# Patient Record
Sex: FEMALE | Race: WHITE | Employment: FULL TIME | URBAN - METROPOLITAN AREA
[De-identification: names, ages, dates, MRNs, and addresses within clinical notes are randomized per-mention and may not be internally consistent; named-entity substitution may affect disease eponyms.]

---

## 2017-05-10 ENCOUNTER — PATIENT MESSAGE (OUTPATIENT)
Dept: FAMILY MEDICINE CLINIC | Age: 42
End: 2017-05-10

## 2017-06-23 ENCOUNTER — PATIENT MESSAGE (OUTPATIENT)
Dept: FAMILY MEDICINE CLINIC | Age: 42
End: 2017-06-23

## 2017-06-28 ENCOUNTER — OFFICE VISIT (OUTPATIENT)
Dept: FAMILY MEDICINE CLINIC | Age: 42
End: 2017-06-28

## 2017-06-28 VITALS
WEIGHT: 130 LBS | HEART RATE: 71 BPM | DIASTOLIC BLOOD PRESSURE: 58 MMHG | OXYGEN SATURATION: 99 % | SYSTOLIC BLOOD PRESSURE: 95 MMHG | TEMPERATURE: 98.1 F | BODY MASS INDEX: 20.89 KG/M2

## 2017-06-28 DIAGNOSIS — F40.243 PHOBIA, FLYING: Primary | ICD-10-CM

## 2017-06-28 DIAGNOSIS — Z79.899 HIGH RISK MEDICATION USE: ICD-10-CM

## 2017-06-28 PROCEDURE — 99214 OFFICE O/P EST MOD 30 MIN: CPT | Performed by: FAMILY MEDICINE

## 2017-06-28 RX ORDER — LEVONORGESTREL AND ETHINYL ESTRADIOL 0.1-0.02MG
1 KIT ORAL DAILY
COMMUNITY
End: 2022-03-04

## 2017-06-28 RX ORDER — LORAZEPAM 0.5 MG/1
0.5 TABLET ORAL EVERY 6 HOURS PRN
Qty: 10 TABLET | Refills: 0 | Status: SHIPPED | OUTPATIENT
Start: 2017-06-28 | End: 2019-06-13 | Stop reason: SDUPTHER

## 2017-11-17 ENCOUNTER — OFFICE VISIT (OUTPATIENT)
Dept: FAMILY MEDICINE CLINIC | Age: 42
End: 2017-11-17

## 2017-11-17 VITALS
DIASTOLIC BLOOD PRESSURE: 79 MMHG | WEIGHT: 136 LBS | TEMPERATURE: 98.3 F | OXYGEN SATURATION: 98 % | RESPIRATION RATE: 16 BRPM | SYSTOLIC BLOOD PRESSURE: 123 MMHG | HEART RATE: 77 BPM | BODY MASS INDEX: 21.86 KG/M2

## 2017-11-17 DIAGNOSIS — R22.0 NASAL SWELLING: Primary | ICD-10-CM

## 2017-11-17 PROCEDURE — 99214 OFFICE O/P EST MOD 30 MIN: CPT | Performed by: FAMILY MEDICINE

## 2017-11-17 RX ORDER — PREDNISONE 20 MG/1
TABLET ORAL
Qty: 18 TABLET | Refills: 0 | Status: SHIPPED | OUTPATIENT
Start: 2017-11-17 | End: 2017-11-27

## 2017-11-17 RX ORDER — AMOXICILLIN AND CLAVULANATE POTASSIUM 875; 125 MG/1; MG/1
1 TABLET, FILM COATED ORAL 2 TIMES DAILY
Qty: 20 TABLET | Refills: 0 | Status: SHIPPED | OUTPATIENT
Start: 2017-11-17 | End: 2017-11-27

## 2017-11-17 NOTE — PROGRESS NOTES
Here for eval of 2-3d of some pain and swelling to bridge of nose. Pt does not remember any trauma or injury, but felt pain and discomfort in area. Pt did develop some nasal ocngestion and sinus pressure. Sx have increased, and now with increased swelling and some irritation into nose and down to base of nose. No significant drainage or discharge. No fever, chills. No ear pain, sore throat. No trauma or irritation. Vision is normal but can see the swelling. Pain is mild but is worse with pressure/touching. Except as noted above in the history of present illness, the review of systems is  negative for headache, vision changes, chest pain, shortness of breath, abdominal pain, urinary sx, bowel changes. Past medical, surgical, and social history reviewed and updated  Medications and allergies reviewed and updated         O: /79   Pulse 77   Temp 98.2336408259256 °F (36.8 °C) (Oral)   Resp 16   Wt 136 lb (61.7 kg)   LMP 11/13/2017   SpO2 98%   Breastfeeding? No   BMI 21.86 kg/m²   GEN: No acute distress, cooperative, well nourished, alert. HEENT: PEERLA, EOMI , normocephalic/atraumatic, mild to moderate swelling and tenderness to upper bridge of nose w/o mass, lesion. Inside of nose with mild swelling, no discharge. No erythema to skin  Neck: soft, supple, no thyromegaly, mass, no Lymphadenopathy  CV: Regular rate and rhythm, no murmur, rubs, gallops. No edema. Resp: Clear to auscultation bilaterally good air entry bilaterally  No crackles, wheeze. Breathing comfortably. Neuro: cranial nerves II-XII intact. Gait within normal limits. Sensation intact, strength 5/5 bilateral upper extremities, bilateral lower extremities         Current Outpatient Prescriptions   Medication Sig Dispense Refill    tretinoin (RETIN-A) 0.025 % cream Apply topically nightly Apply topically nightly.       amoxicillin-clavulanate (AUGMENTIN) 875-125 MG per tablet Take 1 tablet by mouth 2 times daily for 10 days 20 tablet 0    predniSONE (DELTASONE) 20 MG tablet Take 3 tabs by mouth po qd x 3d, then 2tabs po qd x 3d, then 1 tab po qd x 3d. 18 tablet 0    levonorgestrel-ethinyl estradiol (AVIANE;ALESSE;LESSINA) 0.1-20 MG-MCG per tablet Take 1 tablet by mouth daily      Probiotic Product (PRO-BIOTIC BLEND PO) Take by mouth      fluticasone (FLONASE) 50 MCG/ACT nasal spray 2 sprays by Nasal route daily. 1 Bottle 5    therapeutic multivitamin-minerals (THERAGRAN-M) tablet Take 1 tablet by mouth daily.  naproxen (NAPROSYN) 500 MG tablet Take 1 tablet by mouth 2 times daily (with meals). 60 tablet 1    mometasone (NASONEX) 50 MCG/ACT nasal spray 2 sprays by Nasal route daily. 1 Inhaler 5     No current facility-administered medications for this visit. ASSESSMENT / PLAN:    1. Nasal swelling  No neurologic sx, no concern for high grade lesion  tx with augmentin as below and monitor over the next 2-3d, and if persistent or increased sx, start steroid taper  To ER for vision changes, nausea, vomiting, fever, chills  - amoxicillin-clavulanate (AUGMENTIN) 875-125 MG per tablet; Take 1 tablet by mouth 2 times daily for 10 days  Dispense: 20 tablet; Refill: 0  - predniSONE (DELTASONE) 20 MG tablet; Take 3 tabs by mouth po qd x 3d, then 2tabs po qd x 3d, then 1 tab po qd x 3d. Dispense: 18 tablet; Refill: 0           Follow-up appointment:   Call with update monday    Discussed use, benefit, and side effects of all prescribed medications. Barriers to medication compliance addressed. All patient questions answered. Pt voiced understanding. When applicable, patient's outside records were reviewed through [facility]. The patient has signed appropriate paperworks/consents. Dragon dictation software was used for parts of this progress note. All attempts were made to correct any errors and ensure accuracy.

## 2017-11-20 ENCOUNTER — TELEPHONE (OUTPATIENT)
Dept: FAMILY MEDICINE CLINIC | Age: 42
End: 2017-11-20

## 2017-11-20 NOTE — TELEPHONE ENCOUNTER
Patient was seen Friday for nasal discomfort and swelling. Patient was told to call for update. Patient said she's improved, less swelling and less pain.  Just JAVI

## 2018-02-05 LAB
CHOLESTEROL, TOTAL: 120 MG/DL
CHOLESTEROL/HDL RATIO: ABNORMAL
GLUCOSE BLD-MCNC: 72 MG/DL
HDLC SERPL-MCNC: 92 MG/DL (ref 35–70)
LDL CHOLESTEROL CALCULATED: ABNORMAL MG/DL (ref 0–160)
TRIGL SERPL-MCNC: 308 MG/DL
VLDLC SERPL CALC-MCNC: ABNORMAL MG/DL

## 2018-02-14 ENCOUNTER — OFFICE VISIT (OUTPATIENT)
Dept: FAMILY MEDICINE CLINIC | Age: 43
End: 2018-02-14

## 2018-02-14 VITALS
TEMPERATURE: 97.9 F | HEIGHT: 66 IN | BODY MASS INDEX: 21.86 KG/M2 | HEART RATE: 71 BPM | DIASTOLIC BLOOD PRESSURE: 64 MMHG | RESPIRATION RATE: 14 BRPM | SYSTOLIC BLOOD PRESSURE: 115 MMHG | WEIGHT: 136 LBS | OXYGEN SATURATION: 100 %

## 2018-02-14 DIAGNOSIS — Z13.220 SCREENING CHOLESTEROL LEVEL: ICD-10-CM

## 2018-02-14 DIAGNOSIS — Z00.00 ROUTINE GENERAL MEDICAL EXAMINATION AT A HEALTH CARE FACILITY: Primary | ICD-10-CM

## 2018-02-14 DIAGNOSIS — I73.00 RAYNAUD'S DISEASE WITHOUT GANGRENE: ICD-10-CM

## 2018-02-14 DIAGNOSIS — J30.9 ALLERGIC RHINITIS, UNSPECIFIED CHRONICITY, UNSPECIFIED SEASONALITY, UNSPECIFIED TRIGGER: ICD-10-CM

## 2018-02-14 PROCEDURE — 99396 PREV VISIT EST AGE 40-64: CPT | Performed by: FAMILY MEDICINE

## 2018-02-14 NOTE — PROGRESS NOTES
SUBJECTIVE:   38 y.o. female for annual routine checkup. Current Outpatient Prescriptions   Medication Sig Dispense Refill    tretinoin (RETIN-A) 0.025 % cream Apply topically nightly Apply topically nightly.  levonorgestrel-ethinyl estradiol (AVIANE;ALESSE;LESSINA) 0.1-20 MG-MCG per tablet Take 1 tablet by mouth daily      Probiotic Product (PRO-BIOTIC BLEND PO) Take by mouth      therapeutic multivitamin-minerals (THERAGRAN-M) tablet Take 1 tablet by mouth daily.  fluticasone (FLONASE) 50 MCG/ACT nasal spray 2 sprays by Nasal route daily. 1 Bottle 5    naproxen (NAPROSYN) 500 MG tablet Take 1 tablet by mouth 2 times daily (with meals). 60 tablet 1    mometasone (NASONEX) 50 MCG/ACT nasal spray 2 sprays by Nasal route daily. 1 Inhaler 5     No current facility-administered medications for this visit. Allergies: Review of patient's allergies indicates no known allergies. Patient's last menstrual period was 02/03/2018. PAP 7/16; 7/14- GYN- Dr. Marleni Melgar 10/17; 6/12  History of Abnormal PAP: never   Sexually active: yes   Self breast exam: yes   LMP: 2/3/18 - menses q 28 days . No bleeding between. Smoker: no   Alcohol: occasionally 1-2/month   Caffeine: no  Exercise: daily - runs 3 miles or walks. Elliptical 3 miles. Patient had lipid screen at [facility] through 's work. Her triglycerides were elevated at 308 and LDL was 0. H/o Raynaud's. Some numbness to feet with the elliptical ,but not with walking or running. Wears gloves if < 55 degrees. No change in the past 1 year. Left pectoralis area at times has pain X 4-5 months. Not exertional.  Not tender. 1/10 dull , ache. Unsure what aggravates or relieves the pain. Not necessarily daily. She recalls being pulled by 80 lb puppy by left arm 4-5 months. About every 8 times she is told she cannot donate due to anemia. ROS:  Feeling well. No dyspnea or chest pain on exertion.   No abdominal pain, change in bowel habits, black or bloody stools. No urinary tract symptoms. GYN ROS: normal menses, no abnormal bleeding, pelvic pain or discharge, no breast pain or new or enlarging lumps on self exam. No neurological complaints. See patient physical/  ROS questionnaire. Patient's allergies and medications were reviewed. Patient's past medical, surgical, social , and family history were reviewed. OBJECTIVE:   The patient appears well, alert, oriented x 3, in no distress, cooperative. /64   Pulse 71   Temp 97.9 °F (36.6 °C)   Resp 14   Ht 5' 6\" (1.676 m)   Wt 136 lb (61.7 kg)   LMP 02/03/2018   SpO2 100%   BMI 21.95 kg/m²   HEENT: normocephalic, atraumatic, PERRLA, EOMI, tympanic membranes and nasopharynx are normal.  Neck : supple. No adenopathy or thyromegaly. FROM. Upper extremities : DTRs 2+ biceps/ triceps/ brachioradialis bilateral.  FROM. Strength 5/5. Lungs are clear, good air entry, no wheezes, rhonchi or rales. Breathing comfortably. Cardiovascular: Regular rate  and rhythm. S1 and S2 are normal, no murmurs,rubs, and gallops. No edema. Abdomen is soft without tenderness, guarding, mass or organomegaly. Normal bowel sounds. Non distended. Back: Cervical, thoracic and lumbar spine exam is normal without tenderness, masses or kyphoscoliosis. Full range of motion without pain is noted. Lower extremities : DTRs 2+ knees and ankles bilateral.  FROM. Strength 5/5. Negative straight leg-raise. No edema or erythema bilateral.  normal peripheral pulses. Neuro: Cranial nerves 2-12 are normal. Deep tendon reflexes are 2+ and equal to all extremities. No focal sensory, or motor deficit noted. Skin: no rashes or suspicious lesions.      ASSESSMENT:   Galdino Brown was seen today for annual exam.  Diagnoses and all orders for this visit:    Routine general medical examination at a health care facility  -     Lipid Panel  -     TSH without Reflex  -     Renal Function Panel  - CBC  Allergic rhinitis, unspecified chronicity, unspecified seasonality, unspecified trigger        -     Stable. Raynaud's disease without gangrene        -     Stable. Screening cholesterol level        -     Lipid Panel.         -     Reviewed cholesterol done through finger stick at [place] for Health screen and triglycerides in 300s and LDL = 0 . Likely error. This is significantly different from other lipid panels. Patient has been following a cleansing diet, with decreased processed foods and does not eat meat. PLAN:  Follow a low fat, low cholesterol diet,  continue current healthy lifestyle patterns, including regular cardiovascular exercise >150 minutes per week,  and return for routine annual checkup  Yearly mammogram recommended , as well as monthly self breast exam.   Calcium 1200 mg/ day , Vitamin D 400 IU/ day, and weight bearing exercise. Follow up yearly or as needed.

## 2018-02-20 LAB
ALBUMIN SERPL-MCNC: 4.6 G/DL (ref 3.4–5)
ANION GAP SERPL CALCULATED.3IONS-SCNC: 14 MMOL/L (ref 3–16)
BUN BLDV-MCNC: 11 MG/DL (ref 7–20)
CALCIUM SERPL-MCNC: 9 MG/DL (ref 8.3–10.6)
CHLORIDE BLD-SCNC: 101 MMOL/L (ref 99–110)
CHOLESTEROL, TOTAL: 121 MG/DL (ref 0–199)
CO2: 26 MMOL/L (ref 21–32)
CREAT SERPL-MCNC: 0.9 MG/DL (ref 0.6–1.1)
GFR AFRICAN AMERICAN: >60
GFR NON-AFRICAN AMERICAN: >60
GLUCOSE BLD-MCNC: 81 MG/DL (ref 70–99)
HCT VFR BLD CALC: 41.9 % (ref 36–48)
HDLC SERPL-MCNC: 89 MG/DL (ref 40–60)
HEMOGLOBIN: 14.1 G/DL (ref 12–16)
LDL CHOLESTEROL CALCULATED: 18 MG/DL
MCH RBC QN AUTO: 32 PG (ref 26–34)
MCHC RBC AUTO-ENTMCNC: 33.6 G/DL (ref 31–36)
MCV RBC AUTO: 95.3 FL (ref 80–100)
PDW BLD-RTO: 14 % (ref 12.4–15.4)
PHOSPHORUS: 3.7 MG/DL (ref 2.5–4.9)
PLATELET # BLD: 184 K/UL (ref 135–450)
PMV BLD AUTO: 11.3 FL (ref 5–10.5)
POTASSIUM SERPL-SCNC: 4.2 MMOL/L (ref 3.5–5.1)
RBC # BLD: 4.4 M/UL (ref 4–5.2)
SODIUM BLD-SCNC: 141 MMOL/L (ref 136–145)
TRIGL SERPL-MCNC: 68 MG/DL (ref 0–150)
TSH SERPL DL<=0.05 MIU/L-ACNC: 1.33 UIU/ML (ref 0.27–4.2)
VLDLC SERPL CALC-MCNC: 14 MG/DL
WBC # BLD: 5.2 K/UL (ref 4–11)

## 2019-03-01 LAB
CREAT SERPL-MCNC: 1.04 MG/DL
POTASSIUM (K+): 4.1

## 2019-06-13 ENCOUNTER — OFFICE VISIT (OUTPATIENT)
Dept: FAMILY MEDICINE CLINIC | Age: 44
End: 2019-06-13
Payer: COMMERCIAL

## 2019-06-13 ENCOUNTER — TELEPHONE (OUTPATIENT)
Dept: FAMILY MEDICINE CLINIC | Age: 44
End: 2019-06-13

## 2019-06-13 VITALS
OXYGEN SATURATION: 99 % | DIASTOLIC BLOOD PRESSURE: 61 MMHG | RESPIRATION RATE: 14 BRPM | BODY MASS INDEX: 20.88 KG/M2 | HEIGHT: 67 IN | SYSTOLIC BLOOD PRESSURE: 104 MMHG | WEIGHT: 133 LBS | HEART RATE: 80 BPM

## 2019-06-13 DIAGNOSIS — J30.9 ALLERGIC RHINITIS, UNSPECIFIED SEASONALITY, UNSPECIFIED TRIGGER: ICD-10-CM

## 2019-06-13 DIAGNOSIS — I73.00 RAYNAUD'S DISEASE WITHOUT GANGRENE: ICD-10-CM

## 2019-06-13 DIAGNOSIS — F41.9 ANXIETY: ICD-10-CM

## 2019-06-13 DIAGNOSIS — Z00.00 ROUTINE GENERAL MEDICAL EXAMINATION AT A HEALTH CARE FACILITY: Primary | ICD-10-CM

## 2019-06-13 DIAGNOSIS — L70.9 ACNE, UNSPECIFIED ACNE TYPE: ICD-10-CM

## 2019-06-13 DIAGNOSIS — Z23 NEED FOR MMR VACCINE: ICD-10-CM

## 2019-06-13 DIAGNOSIS — F40.243 PHOBIA, FLYING: ICD-10-CM

## 2019-06-13 PROCEDURE — 99396 PREV VISIT EST AGE 40-64: CPT | Performed by: FAMILY MEDICINE

## 2019-06-13 RX ORDER — SPIRONOLACTONE 25 MG/1
25 TABLET ORAL DAILY
COMMUNITY
Start: 2019-05-21

## 2019-06-13 RX ORDER — LORAZEPAM 0.5 MG/1
0.5 TABLET ORAL EVERY 6 HOURS PRN
Qty: 10 TABLET | Refills: 0 | Status: SHIPPED | OUTPATIENT
Start: 2019-06-13 | End: 2019-07-13

## 2019-06-13 RX ORDER — ESCITALOPRAM OXALATE 5 MG/1
5 TABLET ORAL DAILY
Qty: 30 TABLET | Refills: 1 | Status: SHIPPED | OUTPATIENT
Start: 2019-06-13 | End: 2019-07-11 | Stop reason: SDUPTHER

## 2019-06-13 ASSESSMENT — PATIENT HEALTH QUESTIONNAIRE - PHQ9
SUM OF ALL RESPONSES TO PHQ9 QUESTIONS 1 & 2: 0
SUM OF ALL RESPONSES TO PHQ QUESTIONS 1-9: 0
SUM OF ALL RESPONSES TO PHQ QUESTIONS 1-9: 0
1. LITTLE INTEREST OR PLEASURE IN DOING THINGS: 0
2. FEELING DOWN, DEPRESSED OR HOPELESS: 0

## 2019-06-13 NOTE — PROGRESS NOTES
symptoms. GYN ROS: normal menses, no abnormal bleeding, pelvic pain or discharge, no breast pain or new or enlarging lumps on self exam. No neurological complaints. See patient physical/  ROS questionnaire. Patient's allergies and medications were reviewed. Patient's past medical, surgical, social , and family history were reviewed. OBJECTIVE:   The patient appears well, alert, oriented x 3, in no distress, cooperative. /61   Pulse 80   Resp 14   Ht 5' 6.5\" (1.689 m)   Wt 133 lb (60.3 kg)   LMP 05/30/2019   SpO2 99%   Breastfeeding? No   BMI 21.15 kg/m²    There were no vitals filed for this visit. HEENT: normocephalic, atraumatic, PERRLA, EOMI, tympanic membranes and nasopharynx are normal.  Neck : supple. No adenopathy or thyromegaly. FROM. Upper extremities : DTRs 2+ biceps/ triceps/ brachioradialis bilateral.  FROM. Strength 5/5. Lungs are clear, good air entry, no wheezes, rhonchi or rales. Breathing comfortably. Cardiovascular: Regular rate  and rhythm. S1 and S2 are normal, no murmurs,rubs, and gallops. No edema. Abdomen is soft without tenderness, guarding, mass or organomegaly. Normal bowel sounds. Non distended. Back: Cervical, thoracic and lumbar spine exam is normal without tenderness, masses or kyphoscoliosis. Full range of motion without pain is noted. Lower extremities : DTRs 2+ knees and ankles bilateral.  FROM. Strength 5/5. Negative straight leg-raise. No edema or erythema bilateral.  normal peripheral pulses. Neuro: Cranial nerves 2-12 are normal. Deep tendon reflexes are 2+ and equal to all extremities. No focal sensory, or motor deficit noted. Skin: no rashes or suspicious lesions. ASSESSMENT:   1. Routine general medical examination at a health care facility  - had Health screen through 's work in 2/19, including complete metabolic panel , cholesterol , CBC    2. Anxiety  - start low dose Lexapro qd.  Medication discussed, including use , risks, side effects and benefits. Patient voiced understanding and agrees with use. Barriers to medication compliance addressed. All the patient's questions were addressed. - escitalopram (LEXAPRO) 5 MG tablet; Take 1 tablet by mouth daily  Dispense: 30 tablet; Refill: 1    3. Phobia, flying  - LORazepam (ATIVAN) 0.5 MG tablet; Take 1 tablet by mouth every 6 hours as needed for Anxiety for up to 30 days. 1/2 - 1 po q 8 hours anxiety or 30 minutes prior to flight. Dispense: 10 tablet; Refill: 0. Side effects of medication discussed including sedation and addiction potential.   - Controlled Substance Monitoring:    Acute and Chronic Pain Monitoring:   RX Monitoring 6/28/2017   Attestation The Prescription Monitoring Report for this patient was reviewed today. Periodic Controlled Substance Monitoring Possible medication side effects, risk of tolerance and/or dependence, and alternative treatments discussed; No signs of potential drug abuse or diversion identified. 4. Allergic rhinitis, unspecified seasonality, unspecified trigger  - stable. 5. Raynaud's disease without gangrene  - stable. 6. Acne, unspecified acne type  - stable. - spironolactone (ALDACTONE) 25 MG tablet; Take 25 mg by mouth daily     7. Need for MMR vaccine  - prefers to wait until back from upcoming trip to [address] PLAN:  Follow a low fat, low cholesterol diet,  continue current healthy lifestyle patterns, including regular cardiovascular exercise >150 minutes per week,  and return for routine annual checkup  Yearly mammogram recommended , as well as monthly self breast exam.   Calcium 1200 mg/ day , Vitamin D 400 IU/ day, and weight bearing exercise. Follow up 4 -6 weeks/ prn.

## 2019-06-17 NOTE — TELEPHONE ENCOUNTER
[facility] called checking on previous message concerning medication directions clarification. Patient leaving out-of town tomorrow morning. Please advise. Thanks.

## 2019-07-11 ENCOUNTER — OFFICE VISIT (OUTPATIENT)
Dept: FAMILY MEDICINE CLINIC | Age: 44
End: 2019-07-11
Payer: COMMERCIAL

## 2019-07-11 VITALS
RESPIRATION RATE: 12 BRPM | HEART RATE: 77 BPM | WEIGHT: 131 LBS | DIASTOLIC BLOOD PRESSURE: 66 MMHG | TEMPERATURE: 98 F | SYSTOLIC BLOOD PRESSURE: 105 MMHG | BODY MASS INDEX: 21.05 KG/M2 | HEIGHT: 66 IN

## 2019-07-11 DIAGNOSIS — F41.9 ANXIETY: ICD-10-CM

## 2019-07-11 DIAGNOSIS — Z23 NEED FOR MMR VACCINE: Primary | ICD-10-CM

## 2019-07-11 PROCEDURE — 90707 MMR VACCINE SC: CPT | Performed by: FAMILY MEDICINE

## 2019-07-11 PROCEDURE — 99214 OFFICE O/P EST MOD 30 MIN: CPT | Performed by: FAMILY MEDICINE

## 2019-07-11 PROCEDURE — 90471 IMMUNIZATION ADMIN: CPT | Performed by: FAMILY MEDICINE

## 2019-07-11 RX ORDER — ESCITALOPRAM OXALATE 5 MG/1
5 TABLET ORAL DAILY
Qty: 30 TABLET | Refills: 5 | Status: SHIPPED | OUTPATIENT
Start: 2019-07-11 | End: 2022-03-04

## 2019-09-13 ENCOUNTER — OFFICE VISIT (OUTPATIENT)
Dept: FAMILY MEDICINE CLINIC | Age: 44
End: 2019-09-13
Payer: COMMERCIAL

## 2019-09-13 VITALS
TEMPERATURE: 98.2 F | DIASTOLIC BLOOD PRESSURE: 75 MMHG | OXYGEN SATURATION: 98 % | HEIGHT: 67 IN | BODY MASS INDEX: 20.25 KG/M2 | WEIGHT: 129 LBS | HEART RATE: 77 BPM | SYSTOLIC BLOOD PRESSURE: 100 MMHG

## 2019-09-13 DIAGNOSIS — L03.011 PARONYCHIA OF RIGHT THUMB: Primary | ICD-10-CM

## 2019-09-13 DIAGNOSIS — M21.611 BUNION OF GREAT TOE OF RIGHT FOOT: ICD-10-CM

## 2019-09-13 PROCEDURE — 99213 OFFICE O/P EST LOW 20 MIN: CPT | Performed by: FAMILY MEDICINE

## 2019-09-13 RX ORDER — SULFAMETHOXAZOLE AND TRIMETHOPRIM 800; 160 MG/1; MG/1
1 TABLET ORAL 2 TIMES DAILY
Qty: 20 TABLET | Refills: 0 | Status: SHIPPED | OUTPATIENT
Start: 2019-09-13 | End: 2019-09-23

## 2019-11-12 ENCOUNTER — OFFICE VISIT (OUTPATIENT)
Dept: ORTHOPEDIC SURGERY | Age: 44
End: 2019-11-12
Payer: COMMERCIAL

## 2019-11-12 VITALS
DIASTOLIC BLOOD PRESSURE: 63 MMHG | BODY MASS INDEX: 20.4 KG/M2 | RESPIRATION RATE: 16 BRPM | WEIGHT: 130 LBS | HEIGHT: 67 IN | SYSTOLIC BLOOD PRESSURE: 114 MMHG

## 2019-11-12 DIAGNOSIS — S60.111S: Primary | ICD-10-CM

## 2019-11-12 PROCEDURE — 99243 OFF/OP CNSLTJ NEW/EST LOW 30: CPT | Performed by: ORTHOPAEDIC SURGERY

## 2019-11-16 ENCOUNTER — PATIENT MESSAGE (OUTPATIENT)
Dept: FAMILY MEDICINE CLINIC | Age: 44
End: 2019-11-16

## 2019-11-18 RX ORDER — CEPHALEXIN 500 MG/1
500 CAPSULE ORAL 3 TIMES DAILY
Qty: 21 CAPSULE | Refills: 0 | Status: SHIPPED | OUTPATIENT
Start: 2019-11-18 | End: 2022-03-04

## 2020-01-28 ENCOUNTER — OFFICE VISIT (OUTPATIENT)
Dept: FAMILY MEDICINE CLINIC | Age: 45
End: 2020-01-28
Payer: COMMERCIAL

## 2020-01-28 ENCOUNTER — HOSPITAL ENCOUNTER (OUTPATIENT)
Dept: GENERAL RADIOLOGY | Age: 45
Discharge: HOME | End: 2020-01-28
Payer: COMMERCIAL

## 2020-01-28 ENCOUNTER — HOSPITAL ENCOUNTER (OUTPATIENT)
Age: 45
Discharge: HOME | End: 2020-01-28
Payer: COMMERCIAL

## 2020-01-28 VITALS
RESPIRATION RATE: 14 BRPM | WEIGHT: 134 LBS | BODY MASS INDEX: 20.99 KG/M2 | DIASTOLIC BLOOD PRESSURE: 74 MMHG | SYSTOLIC BLOOD PRESSURE: 118 MMHG | HEART RATE: 69 BPM | OXYGEN SATURATION: 99 %

## 2020-01-28 DIAGNOSIS — R61 NIGHT SWEATS: ICD-10-CM

## 2020-01-28 LAB
A/G RATIO: 1.9 (ref 1.1–2.2)
ALBUMIN SERPL-MCNC: 4.7 G/DL (ref 3.4–5)
ALP BLD-CCNC: 40 U/L (ref 40–129)
ALT SERPL-CCNC: 8 U/L (ref 10–40)
ANION GAP SERPL CALCULATED.3IONS-SCNC: 15 MMOL/L (ref 3–16)
AST SERPL-CCNC: 19 U/L (ref 15–37)
BILIRUB SERPL-MCNC: 0.4 MG/DL (ref 0–1)
BUN BLDV-MCNC: 9 MG/DL (ref 7–20)
CALCIUM SERPL-MCNC: 9.3 MG/DL (ref 8.3–10.6)
CHLORIDE BLD-SCNC: 101 MMOL/L (ref 99–110)
CO2: 24 MMOL/L (ref 21–32)
CREAT SERPL-MCNC: 0.9 MG/DL (ref 0.6–1.1)
GFR AFRICAN AMERICAN: >60
GFR NON-AFRICAN AMERICAN: >60
GLOBULIN: 2.5 G/DL
GLUCOSE BLD-MCNC: 89 MG/DL (ref 70–99)
HCT VFR BLD CALC: 40.6 % (ref 36–48)
HEMOGLOBIN: 13.6 G/DL (ref 12–16)
MCH RBC QN AUTO: 31.9 PG (ref 26–34)
MCHC RBC AUTO-ENTMCNC: 33.4 G/DL (ref 31–36)
MCV RBC AUTO: 95.4 FL (ref 80–100)
PDW BLD-RTO: 12.7 % (ref 12.4–15.4)
PLATELET # BLD: 209 K/UL (ref 135–450)
PMV BLD AUTO: 10.1 FL (ref 5–10.5)
POTASSIUM SERPL-SCNC: 4.5 MMOL/L (ref 3.5–5.1)
RBC # BLD: 4.26 M/UL (ref 4–5.2)
SODIUM BLD-SCNC: 140 MMOL/L (ref 136–145)
TOTAL PROTEIN: 7.2 G/DL (ref 6.4–8.2)
TSH SERPL DL<=0.05 MIU/L-ACNC: 1.2 UIU/ML (ref 0.27–4.2)
WBC # BLD: 6.6 K/UL (ref 4–11)

## 2020-01-28 PROCEDURE — 73140 X-RAY EXAM OF FINGER(S): CPT

## 2020-01-28 PROCEDURE — 99214 OFFICE O/P EST MOD 30 MIN: CPT | Performed by: FAMILY MEDICINE

## 2020-01-28 ASSESSMENT — PATIENT HEALTH QUESTIONNAIRE - PHQ9
SUM OF ALL RESPONSES TO PHQ QUESTIONS 1-9: 0
SUM OF ALL RESPONSES TO PHQ QUESTIONS 1-9: 0
SUM OF ALL RESPONSES TO PHQ9 QUESTIONS 1 & 2: 0
1. LITTLE INTEREST OR PLEASURE IN DOING THINGS: 0
2. FEELING DOWN, DEPRESSED OR HOPELESS: 0

## 2020-01-28 NOTE — PROGRESS NOTES
Patient is here for night sweats X 4-5 months. On birth control pills, Zeus Hannah since 5/18 . She does not get much of a period. Spotting for 0-2 days . She has not had a real menses for months. She is followed by Shree Schmitz. Her night sweats are almost every night. She is usually able to go back to sleep . She gets drenched with night sweats. She is not usually hot. She usually awakens once per night. She tried to wear less clothes , but she is usually cold otherwise. Feels fine otherwise. No cough. Denies pain . Review of Systems    ROS: All other systems were reviewed and are negative . Patient's allergies and medications were reviewed. Patient's past medical, surgical, social , and family history were reviewed. Wt Readings from Last 3 Encounters:   01/28/20 134 lb (60.8 kg)   11/12/19 130 lb (59 kg)   09/13/19 129 lb (58.5 kg)       OBJECTIVE:  /74   Pulse 69   Resp 14   Wt 134 lb (60.8 kg)   SpO2 99%   BMI 20.99 kg/m²     Physical Exam    General: NAD, cooperative, alert and oriented X 3. Mood / affect is good. good insight. well hydrated. Neck : no lymphadenopathy, supple, FROM  CV: Regular rate and rhythm , no murmurs/ rub/ gallop. No edema. Lungs : CTA bilaterally, breathing comfortably  Abdomen: positive bowel sounds, soft , non tender, non distended. No hepatosplenomegaly. No CVA tenderness. Skin: no rashes. Non tender. Right thumb: slight nail discoloration to distal 2/3rds. Clear at nail base. No edema. Non tender. ASSESSMENT/  PLAN:  1. Night sweats  - CBC; Future  - TSH without Reflex; Future  - Quantiferon, Incubated; Future  - Comprehensive Metabolic Panel; Future  - Discussed trial of Neurontin qhs, but patient prefers to hold. Also discussed supplements. 2. Thumb pain, right  - monitor. Encouraged to trim nail shorter. - avoid covering at night . Follow up 3 months/ prn.

## 2020-01-31 LAB
QUANTI TB GOLD PLUS: NEGATIVE
QUANTI TB1 MINUS NIL: 0.02 IU/ML (ref 0–0.34)
QUANTI TB2 MINUS NIL: 0 IU/ML (ref 0–0.34)
QUANTIFERON MITOGEN: >10 IU/ML
QUANTIFERON NIL: 0.05 IU/ML

## 2020-05-12 ENCOUNTER — TELEPHONE (OUTPATIENT)
Dept: FAMILY MEDICINE CLINIC | Age: 45
End: 2020-05-12

## 2020-05-12 ENCOUNTER — E-VISIT (OUTPATIENT)
Dept: FAMILY MEDICINE CLINIC | Age: 45
End: 2020-05-12
Payer: COMMERCIAL

## 2020-05-12 PROCEDURE — 98970 NQHP OL DIG ASSMT&MGMT 5-10: CPT | Performed by: NURSE PRACTITIONER

## 2020-05-12 RX ORDER — NITROFURANTOIN 25; 75 MG/1; MG/1
100 CAPSULE ORAL 2 TIMES DAILY
Qty: 14 CAPSULE | Refills: 0 | Status: SHIPPED | OUTPATIENT
Start: 2020-05-12 | End: 2020-05-19

## 2020-05-12 NOTE — TELEPHONE ENCOUNTER
Yuliya Bocanegra called to see if Dr. Nnamdi Diaz can review her e-visit as soon as possible. She is feeling worse and now there is visible blood when she wipes after urinating. Please advise. Thanks. Yuliya Bocanegra [phone] (home)     Ok to leave message.

## 2020-05-12 NOTE — TELEPHONE ENCOUNTER
It seems the [facility] Saavedra sent a rx to the pharmacy already. Per HIPAA it is ok to leave message on patient voicemail. [facility] informing patient to check her my chart and that an Rx was sent.

## 2020-05-27 ENCOUNTER — PATIENT MESSAGE (OUTPATIENT)
Dept: FAMILY MEDICINE CLINIC | Age: 45
End: 2020-05-27

## 2020-05-27 NOTE — TELEPHONE ENCOUNTER
From: Tanesha York  To: Charlene Best MD  Sent: 5/27/2020  2:10 PM EDT  Subject: Non-Urgent Medical Question    Hi Dr. Robles Nassar,    Do you have a therapist you recommend to people? There are so many out there that I don't know where to start. I have a few recommendations from friends but they used their therapists for specific needs that are not what I am in need of.   I just want someone to talk to in general.    Thanks,  Tanesha York

## 2020-05-28 NOTE — TELEPHONE ENCOUNTER
Yes . Dr. Angelita Calzada is a good option. [facility] is another option - [address] [facility].

## 2020-08-26 ENCOUNTER — PATIENT MESSAGE (OUTPATIENT)
Dept: FAMILY MEDICINE CLINIC | Age: 45
End: 2020-08-26

## 2020-08-26 NOTE — TELEPHONE ENCOUNTER
From: Lisa Landon  To: Amalia Gaytan MD  Sent: 8/26/2020 8:08 AM EDT  Subject: Non-Urgent Medical Question    Good morning! My daughter was diagnosed with Celiacs on Friday. Her GI doctor at [address] [place] [address] [address] is asking the rest of her family to get tested since it is hereditary. He said I only need a Total IGA and TDG IGA so not a complete blood panel. Is that something that you can order for me please?   Thank you,  Toma Stevens

## 2020-08-26 NOTE — TELEPHONE ENCOUNTER
..Discharge summary and discharge medications reviewed with patient and appropriate educational materials and side effects teaching were provided. patient  Given 1 paper prescriptions and 0 electronic prescriptions sent to pt's listed pharmacy. Patient  verbalized understanding of the importance of discussing medications with his or her physician or clinic they will be following up with. No si/s of acute distress prior to discharge. Patient offered wheelchair from treatment area to hospital entrance, patient declined wheelchair. Order is in [facility]

## 2020-09-11 DIAGNOSIS — Z83.79 FAMILY HISTORY OF CELIAC DISEASE: ICD-10-CM

## 2020-09-11 DIAGNOSIS — R14.0 BLOATING: ICD-10-CM

## 2020-09-18 LAB
Lab: NORMAL
REPORT: NORMAL
THIS TEST SENT TO: NORMAL

## 2020-10-28 ENCOUNTER — OFFICE VISIT (OUTPATIENT)
Dept: ORTHOPEDIC SURGERY | Age: 45
End: 2020-10-28
Payer: COMMERCIAL

## 2020-10-28 VITALS — TEMPERATURE: 97 F | BODY MASS INDEX: 21.03 KG/M2 | WEIGHT: 134 LBS | HEIGHT: 67 IN

## 2020-10-28 PROBLEM — L03.011: Status: ACTIVE | Noted: 2020-10-28

## 2020-10-28 PROCEDURE — 99213 OFFICE O/P EST LOW 20 MIN: CPT | Performed by: ORTHOPAEDIC SURGERY

## 2020-10-28 NOTE — PROGRESS NOTES
answered. No surgical treatment is indicated at this time. If active infection recurs (swelling, erythema, pain, abscess) she is instructed to return for follow up examination and consideration of surgical drainage.

## 2022-03-04 ENCOUNTER — TELEMEDICINE (OUTPATIENT)
Dept: FAMILY MEDICINE CLINIC | Age: 47
End: 2022-03-04
Payer: COMMERCIAL

## 2022-03-04 DIAGNOSIS — J06.9 VIRAL URI: Primary | ICD-10-CM

## 2022-03-04 PROCEDURE — 99213 OFFICE O/P EST LOW 20 MIN: CPT | Performed by: FAMILY MEDICINE

## 2022-03-04 RX ORDER — NORETHINDRONE ACETATE AND ETHINYL ESTRADIOL, AND FERROUS FUMARATE 1MG-20(24)
KIT ORAL
COMMUNITY
Start: 2022-02-01

## 2022-03-04 RX ORDER — FLUOCINOLONE ACETONIDE, HYDROQUINONE, AND TRETINOIN .1; 40; .5 MG/G; MG/G; MG/G
CREAM TOPICAL
COMMUNITY
Start: 2022-01-14

## 2022-03-04 SDOH — ECONOMIC STABILITY: FOOD INSECURITY: WITHIN THE PAST 12 MONTHS, THE FOOD YOU BOUGHT JUST DIDN'T LAST AND YOU DIDN'T HAVE MONEY TO GET MORE.: NEVER TRUE

## 2022-03-04 SDOH — ECONOMIC STABILITY: FOOD INSECURITY: WITHIN THE PAST 12 MONTHS, YOU WORRIED THAT YOUR FOOD WOULD RUN OUT BEFORE YOU GOT MONEY TO BUY MORE.: NEVER TRUE

## 2022-03-04 ASSESSMENT — PATIENT HEALTH QUESTIONNAIRE - PHQ9
SUM OF ALL RESPONSES TO PHQ QUESTIONS 1-9: 0
SUM OF ALL RESPONSES TO PHQ9 QUESTIONS 1 & 2: 0
SUM OF ALL RESPONSES TO PHQ QUESTIONS 1-9: 0
2. FEELING DOWN, DEPRESSED OR HOPELESS: 0
1. LITTLE INTEREST OR PLEASURE IN DOING THINGS: 0
SUM OF ALL RESPONSES TO PHQ QUESTIONS 1-9: 0
SUM OF ALL RESPONSES TO PHQ QUESTIONS 1-9: 0

## 2022-03-04 ASSESSMENT — SOCIAL DETERMINANTS OF HEALTH (SDOH): HOW HARD IS IT FOR YOU TO PAY FOR THE VERY BASICS LIKE FOOD, HOUSING, MEDICAL CARE, AND HEATING?: NOT HARD AT ALL

## 2022-03-04 NOTE — PROGRESS NOTES
3/4/2022    TELEHEALTH EVALUATION -- Audio/Visual (During EJOGX-44 public health emergency)    Due to Shae 19 outbreak, patient's office visit was converted to a virtual visit. Patient was contacted and agreed to proceed with a virtual visit via [email or web address] me  The risks and benefits of converting to a virtual visit were discussed in light of the current infectious disease epidemic. Patient also understood that insurance coverage and co-pays are up to their individual insurance plans. HPI:    Collette Oseguera (:  1975) has requested an audio/video evaluation for the following concern(s):    Chest tightness X 3 days. Cough is productive of yellow sputum. No fever. Nasal congestion and post nasal drip X 1 day. Clear nasal discharge. Irritated throat. No fatigue. Sleeping difficulties last night due to nasal congestion and post nasal drip .  had symptoms 10 days ago. He went to Urgent Care on Monday night and had negative COVID test.  Vishal, son , started with symptoms on Saturday and has h/o asthma. Both were placed on steroids and inhaler and both were negative COVID tests.  had home test X 2 negative. No h/o asthma. No pulse ox available. She had some mild shortness of breath on Wednesday , but not now. No problems with stairs. No nausea, vomiting, diarrhea . No loss of taste or smell. Review of Systems    Prior to Visit Medications    Medication Sig Taking? Authorizing Provider   KARO 24 FE 1-20 MG-MCG(24) TABS  Yes Historical Provider, MD   TRI-PRECIOUS 0.01-4-0.05 % cream APPLY TO AFFECTED AREA(S) ONCE DAILY FOR UP TO 2 MONTHS Yes Historical Provider, MD   spironolactone (ALDACTONE) 25 MG tablet Take 25 mg by mouth daily  Yes Historical Provider, MD   tretinoin (RETIN-A) 0.025 % cream Apply topically nightly Apply topically nightly.  Yes Historical Provider, MD   Probiotic Product (PRO-BIOTIC BLEND PO) Take by mouth Yes Historical Provider, MD   therapeutic multivitamin-minerals [facility]) tablet Take 1 tablet by mouth daily. Yes Historical Provider, MD       No Known Allergies    Social History     Tobacco Use    Smoking status: Never Smoker    Smokeless tobacco: Never Used   Substance Use Topics    Alcohol use: No    Drug use: No        Past Medical History:   Diagnosis Date    Acne     Acute sinusitis     Allergic rhinitis     Basal cell carcinoma 3/01    right shoulder     Mastitis     Raynaud's disease        Past Surgical History:   Procedure Laterality Date    GUM SURGERY  6/00    gum recession    MOUTH SURGERY  12 2010    salivary gland - right - Dr. Antonia Kevin BIOPSY  3/01    right shoulder - basal cell CA       Health Maintenance   Topic Date Due    Hepatitis C screen  Never done    Depression Screen  Never done    Cervical cancer screen  Never done    Potassium monitoring  01/28/2021    Creatinine monitoring  01/28/2021    Breast cancer screen  10/24/2022    Lipid screen  02/20/2023    DTaP/Tdap/Td vaccine (8 - Td or Tdap) 02/08/2026    Flu vaccine  Completed    COVID-19 Vaccine  Completed    HIV screen  Completed    Hepatitis A vaccine  Aged Out    Hepatitis B vaccine  Aged Out    Hib vaccine  Aged Out    Meningococcal (ACWY) vaccine  Aged Out    Pneumococcal 0-64 years Vaccine  Aged Out       Family History   Problem Relation Age of Onset    Breast Cancer Mother 62    Osteoporosis Mother     Lung Cancer Father 61    Osteoporosis Maternal Grandmother        PHYSICAL EXAMINATION:    Vital Signs: (As obtained by patient/caregiver or practitioner observation)     Patient-Reported Vitals 3/4/2022   Patient-Reported Weight 132   Patient-Reported Systolic 493   Patient-Reported Diastolic 68   Patient-Reported Temperature 98.6        Heart rate= 80  Respiratory rate= 14      Constitutional:  Appears well-developed and well-nourished. No apparent distress                              Mental status:  Alert and awake. Oriented to person/place/time. Able to follow commands       Eyes: EOM intact. Sclera-normal. No erythema of conjunctiva. No eye discharge. HENT: Normocephalic, atraumatic. Mouth/Throat: normal. Mucous membranes are moist.      External Ears: Normal       Neck: No visualized mass      Pulmonary/Chest:  Respiratory effort normal.  No visualized signs of difficulty breathing or respiratory distress         Musculoskeletal:   Normal gait with no signs of ataxia. Normal range of motion of neck. Neurological:         No Facial Asymmetry (Cranial nerve 7 motor function) (limited exam to video visit) . No gaze palsy              Skin:                     No significant exanthematous lesions or discoloration noted on facial skin                                        Psychiatric:          Normal Affect. No Hallucinations           Other pertinent observable physical exam findings:       ASSESSMENT/PLAN:  1. Viral URI  - Push fluids - water. Netti pot prn.   - Mucinex and/ or Robitussin prn cough. - Hold on antibiotics or steroids . F/u if no improvement in 3-4d/ prn increased symptoms. An  electronic signature was used to authenticate this note. --David Mcnamara MD on 3/4/2022 at 2:14 PM      Pursuant to the emergency declaration under the [facility] waiver authority and the [employer] and [employer] Act, this Virtual  Visit was conducted, with patient's consent, to reduce the patient's risk of exposure to COVID-19 and provide continuity of care for an established patient. Services were provided through a video synchronous discussion virtually to substitute for in-person clinic visit.

## 2022-07-02 ENCOUNTER — PATIENT MESSAGE (OUTPATIENT)
Dept: FAMILY MEDICINE CLINIC | Age: 47
End: 2022-07-02

## 2022-07-05 NOTE — TELEPHONE ENCOUNTER
From: Darryl Check  To: Dr. Taj Pierre: 2022 10:40 AM EDT  Subject: covid    Hi, I tested positive for covid last night. This is my first time having it (that I know of) so I wanted to touch base and see if there was anything I needed to do. I had a hoarse voice starting on Thursday but did not think anything of it. Then last night (Friday) my daughter tested positive so I also tested and was positive. Now I have a terrible headache and fever and some congestion and cough. Not fun! Our family is all fully vaccinated and somehow had avoided covid until now.  thanks

## 2022-09-23 ENCOUNTER — OFFICE VISIT (OUTPATIENT)
Dept: FAMILY MEDICINE CLINIC | Age: 47
End: 2022-09-23
Payer: COMMERCIAL

## 2022-09-23 VITALS
HEART RATE: 94 BPM | DIASTOLIC BLOOD PRESSURE: 68 MMHG | HEIGHT: 67 IN | OXYGEN SATURATION: 98 % | RESPIRATION RATE: 12 BRPM | SYSTOLIC BLOOD PRESSURE: 110 MMHG | WEIGHT: 136.6 LBS | TEMPERATURE: 97.6 F | BODY MASS INDEX: 21.44 KG/M2

## 2022-09-23 DIAGNOSIS — I73.00 RAYNAUD'S DISEASE WITHOUT GANGRENE: ICD-10-CM

## 2022-09-23 DIAGNOSIS — T63.461A: ICD-10-CM

## 2022-09-23 DIAGNOSIS — J30.9 ALLERGIC RHINITIS, UNSPECIFIED SEASONALITY, UNSPECIFIED TRIGGER: ICD-10-CM

## 2022-09-23 DIAGNOSIS — K59.00 CONSTIPATION, UNSPECIFIED CONSTIPATION TYPE: ICD-10-CM

## 2022-09-23 DIAGNOSIS — L70.9 ACNE, UNSPECIFIED ACNE TYPE: ICD-10-CM

## 2022-09-23 DIAGNOSIS — Z00.00 ROUTINE GENERAL MEDICAL EXAMINATION AT A HEALTH CARE FACILITY: Primary | ICD-10-CM

## 2022-09-23 DIAGNOSIS — Z11.59 NEED FOR HEPATITIS C SCREENING TEST: ICD-10-CM

## 2022-09-23 DIAGNOSIS — Z23 FLU VACCINE NEED: ICD-10-CM

## 2022-09-23 DIAGNOSIS — Z00.00 ROUTINE GENERAL MEDICAL EXAMINATION AT A HEALTH CARE FACILITY: ICD-10-CM

## 2022-09-23 DIAGNOSIS — Z12.11 COLON CANCER SCREENING: ICD-10-CM

## 2022-09-23 LAB
A/G RATIO: 2.2 (ref 1.1–2.2)
ALBUMIN SERPL-MCNC: 4.6 G/DL (ref 3.4–5)
ALP BLD-CCNC: 43 U/L (ref 40–129)
ALT SERPL-CCNC: 7 U/L (ref 10–40)
ANION GAP SERPL CALCULATED.3IONS-SCNC: 13 MMOL/L (ref 3–16)
AST SERPL-CCNC: 17 U/L (ref 15–37)
BILIRUB SERPL-MCNC: 0.3 MG/DL (ref 0–1)
BUN BLDV-MCNC: 9 MG/DL (ref 7–20)
CALCIUM SERPL-MCNC: 8.9 MG/DL (ref 8.3–10.6)
CHLORIDE BLD-SCNC: 103 MMOL/L (ref 99–110)
CO2: 24 MMOL/L (ref 21–32)
CREAT SERPL-MCNC: 1 MG/DL (ref 0.6–1.1)
GFR AFRICAN AMERICAN: >60
GFR NON-AFRICAN AMERICAN: 60
GLUCOSE BLD-MCNC: 85 MG/DL (ref 70–99)
HCT VFR BLD CALC: 36.5 % (ref 36–48)
HEMOGLOBIN: 12 G/DL (ref 12–16)
HEPATITIS C ANTIBODY INTERPRETATION: NORMAL
MCH RBC QN AUTO: 31.1 PG (ref 26–34)
MCHC RBC AUTO-ENTMCNC: 32.9 G/DL (ref 31–36)
MCV RBC AUTO: 94.3 FL (ref 80–100)
PDW BLD-RTO: 13.8 % (ref 12.4–15.4)
PLATELET # BLD: 225 K/UL (ref 135–450)
PMV BLD AUTO: 8.9 FL (ref 5–10.5)
POTASSIUM SERPL-SCNC: 4.2 MMOL/L (ref 3.5–5.1)
RBC # BLD: 3.87 M/UL (ref 4–5.2)
SODIUM BLD-SCNC: 140 MMOL/L (ref 136–145)
TOTAL PROTEIN: 6.7 G/DL (ref 6.4–8.2)
TSH SERPL DL<=0.05 MIU/L-ACNC: 1.5 UIU/ML (ref 0.27–4.2)
WBC # BLD: 4.9 K/UL (ref 4–11)

## 2022-09-23 PROCEDURE — 90674 CCIIV4 VAC NO PRSV 0.5 ML IM: CPT | Performed by: FAMILY MEDICINE

## 2022-09-23 PROCEDURE — 90471 IMMUNIZATION ADMIN: CPT | Performed by: FAMILY MEDICINE

## 2022-09-23 PROCEDURE — 99396 PREV VISIT EST AGE 40-64: CPT | Performed by: FAMILY MEDICINE

## 2022-09-23 ASSESSMENT — PATIENT HEALTH QUESTIONNAIRE - PHQ9
SUM OF ALL RESPONSES TO PHQ9 QUESTIONS 1 & 2: 0
SUM OF ALL RESPONSES TO PHQ QUESTIONS 1-9: 0
1. LITTLE INTEREST OR PLEASURE IN DOING THINGS: 0
SUM OF ALL RESPONSES TO PHQ QUESTIONS 1-9: 0
2. FEELING DOWN, DEPRESSED OR HOPELESS: 0

## 2022-09-23 NOTE — PROGRESS NOTES
SUBJECTIVE:   41 y.o. female for annual routine checkup. Current Outpatient Medications   Medication Sig Dispense Refill    KARO 24 FE 1-20 MG-MCG(24) TABS       TRI-PRECIOUS 0.01-4-0.05 % cream APPLY TO AFFECTED AREA(S) ONCE DAILY FOR UP TO 2 MONTHS      spironolactone (ALDACTONE) 25 MG tablet Take 25 mg by mouth daily       tretinoin (RETIN-A) 0.025 % cream Apply topically nightly Apply topically nightly. Probiotic Product (PRO-BIOTIC BLEND PO) Take by mouth      therapeutic multivitamin-minerals (THERAGRAN-M) tablet Take 1 tablet by mouth daily. No current facility-administered medications for this visit. Allergies: Patient has no known allergies. Patient's last menstrual period was 08/22/2022 (approximate). PAP 7/16; 7/14- GYN- Dr. Pj White : 10/21; 10/20; 10/19; 10/18;10/17; 6/12   History of Abnormal PAP: never   Sexually active: yes   Self breast exam: yes   LMP: on birth control pills and does not get menses regularly and often skips menses and very light. Smoker: no   Alcohol: 2/ month  Caffeine: no   Exercise: used to run ; treadmill walking/ climbing; elliptical , stretching/yoga - daily    Her daughter is a senior at [employer] and looking at [employer] and looking at [employer]. Looking at [employer]. Looking at [facility] or [employer]. Her son is in 8th grade. She did lose weight with intermittent fasting. Over summer she had some constipation issues and changed probiotic. H/o hemorrhoids during and shortly after second pregnancy . Her bowel movements are daily but does not feel she evacuate as well and has multiple bowel movements per day . Some straining. No black stools . Rarely has blood with wiping only. She had COVID in 7/22. She had reaction to first Mcnair Peter booster.- localized. She had bee sting in mid 7/22 and then went on vacation. Used Benadryl. ROS:  Feeling well. No dyspnea or chest pain on exertion.   No abdominal pain, change in bowel habits, black or bloody stools. No urinary tract symptoms. GYN ROS: no abnormal bleeding, pelvic pain or discharge, no breast pain or new or enlarging lumps on self exam. No neurological complaints. See patient physical/  ROS questionnaire. Patient's allergies and medications were reviewed. Patient's past medical, surgical, social , and family history were reviewed. Wt Readings from Last 3 Encounters:   09/23/22 136 lb 9.6 oz (62 kg)   10/28/20 134 lb (60.8 kg)   01/28/20 134 lb (60.8 kg)     OBJECTIVE:   The patient appears well, alert, oriented x 3, in no distress, cooperative. /68   Pulse 94   Temp 97.6 °F (36.4 °C) (Temporal)   Resp 12   Ht 5' 6.6\" (1.692 m)   Wt 136 lb 9.6 oz (62 kg)   LMP 08/22/2022 (Approximate)   SpO2 98%   BMI 21.65 kg/m²    There were no vitals filed for this visit. HEENT: normocephalic, atraumatic, PERRLA, EOMI, tympanic membranes and nasopharynx are normal.  Neck : supple. No adenopathy or thyromegaly. FROM. Upper extremities : DTRs 2+ biceps/ triceps/ brachioradialis bilateral.  FROM. Strength 5/5. Lungs are clear, good air entry, no wheezes, rhonchi or rales. Breathing comfortably. Cardiovascular: Regular rate  and rhythm. S1 and S2 are normal, no murmurs,rubs, and gallops. No edema. Abdomen is soft without tenderness, guarding, mass or organomegaly. Normal bowel sounds. Non distended. Back: Cervical, thoracic and lumbar spine exam is normal without tenderness, masses or kyphoscoliosis. Full range of motion without pain is noted. Lower extremities : DTRs 2+ knees and ankles bilateral.  FROM. Strength 5/5. Negative straight leg-raise. No edema or erythema bilateral.  normal peripheral pulses. Neuro: Cranial nerves 2-12 are normal. Deep tendon reflexes are 2+ and equal to all extremities. No focal sensory, or motor deficit noted. Skin: no rashes or suspicious lesions. ASSESSMENT:   1.  Routine general medical examination at a health care facility  - CBC; Future  - TSH; Future  - Comprehensive Metabolic Panel; Future  - New bivalent booster discussed and consider Moderna versus Pfizer given localized reaction with prior Mcnair Peter booster     2. Acne, unspecified acne type  - Stable on Spironolactone 25 mg qd. - Comprehensive Metabolic Panel; Future    3. Constipation, unspecified constipation type  - TSH; Future  - Push fluids - water. Increase daily dietary fiber. Add fiber supplement - Metamucil 1x/day . 4. Raynaud's disease without gangrene  - Stable. 5. Allergic rhinitis, unspecified seasonality, unspecified trigger  - Stable. 6. Need for hepatitis C screening test  - Hepatitis C Antibody; Future    7. Colon cancer screening  - Referral - Ayala Calzada MD, Gastroenterology, [place]    8. Flu vaccine need  - Influenza, FLUCELVAX, (age 10 mo+), IM, Preservative Free, 0.5 mL    9. Accidental wasp sting  - Localized reaction only, but was 2 weeks post COVID. Monitor for recurrence . Discussed Epi-pen but will hold . If recurs, Benadryl or Zyrtec prn. PLAN:  Follow a low fat, low cholesterol diet,  continue current healthy lifestyle patterns, including regular cardiovascular exercise >150 minutes per week,  and return for routine annual checkup  Colonoscopy screening recommended at 41years of age. Yearly mammogram recommended , as well as monthly self breast exam.   Calcium 1200 mg/ day , Vitamin D 400 IU/ day, and weight bearing exercise. Follow up yearly or as needed.